# Patient Record
Sex: MALE | Race: WHITE | Employment: FULL TIME
[De-identification: names, ages, dates, MRNs, and addresses within clinical notes are randomized per-mention and may not be internally consistent; named-entity substitution may affect disease eponyms.]

---

## 2017-02-08 PROCEDURE — 87086 URINE CULTURE/COLONY COUNT: CPT | Performed by: EMERGENCY MEDICINE

## 2017-04-19 PROBLEM — K63.5 COLON POLYP: Status: ACTIVE | Noted: 2017-04-19

## 2017-05-23 PROBLEM — K31.89 MASS OF STOMACH: Status: ACTIVE | Noted: 2017-05-23

## 2017-06-29 RX ORDER — GARLIC EXTRACT 500 MG
1 CAPSULE ORAL DAILY
COMMUNITY
End: 2018-04-12

## 2017-07-17 ENCOUNTER — SURGERY (OUTPATIENT)
Age: 57
End: 2017-07-17

## 2017-07-17 ENCOUNTER — HOSPITAL ENCOUNTER (OUTPATIENT)
Facility: HOSPITAL | Age: 57
Setting detail: HOSPITAL OUTPATIENT SURGERY
Discharge: HOME | End: 2017-07-17
Attending: INTERNAL MEDICINE | Admitting: INTERNAL MEDICINE
Payer: COMMERCIAL

## 2017-07-17 ENCOUNTER — ANESTHESIA (OUTPATIENT)
Dept: ENDOSCOPY | Facility: HOSPITAL | Age: 57
End: 2017-07-17
Payer: COMMERCIAL

## 2017-07-17 ENCOUNTER — ANESTHESIA EVENT (OUTPATIENT)
Dept: ENDOSCOPY | Facility: HOSPITAL | Age: 57
End: 2017-07-17
Payer: COMMERCIAL

## 2017-07-17 VITALS
HEIGHT: 66 IN | WEIGHT: 174 LBS | HEART RATE: 65 BPM | TEMPERATURE: 98 F | DIASTOLIC BLOOD PRESSURE: 91 MMHG | OXYGEN SATURATION: 94 % | BODY MASS INDEX: 27.97 KG/M2 | SYSTOLIC BLOOD PRESSURE: 115 MMHG | RESPIRATION RATE: 16 BRPM

## 2017-07-17 DIAGNOSIS — K31.9 GASTRIC LESION: ICD-10-CM

## 2017-07-17 PROCEDURE — 88172 CYTP DX EVAL FNA 1ST EA SITE: CPT | Performed by: INTERNAL MEDICINE

## 2017-07-17 PROCEDURE — 0D948ZX DRAINAGE OF ESOPHAGOGASTRIC JUNCTION, VIA NATURAL OR ARTIFICIAL OPENING ENDOSCOPIC, DIAGNOSTIC: ICD-10-PCS | Performed by: INTERNAL MEDICINE

## 2017-07-17 PROCEDURE — 88305 TISSUE EXAM BY PATHOLOGIST: CPT | Performed by: INTERNAL MEDICINE

## 2017-07-17 PROCEDURE — 88341 IMHCHEM/IMCYTCHM EA ADD ANTB: CPT | Performed by: INTERNAL MEDICINE

## 2017-07-17 PROCEDURE — 88342 IMHCHEM/IMCYTCHM 1ST ANTB: CPT | Performed by: INTERNAL MEDICINE

## 2017-07-17 PROCEDURE — 88177 CYTP FNA EVAL EA ADDL: CPT | Performed by: INTERNAL MEDICINE

## 2017-07-17 PROCEDURE — 88173 CYTOPATH EVAL FNA REPORT: CPT | Performed by: INTERNAL MEDICINE

## 2017-07-17 RX ORDER — ACETAMINOPHEN 160 MG/5ML
650 SOLUTION ORAL EVERY 4 HOURS PRN
Status: DISCONTINUED | OUTPATIENT
Start: 2017-07-17 | End: 2017-07-17

## 2017-07-17 RX ORDER — ONDANSETRON 2 MG/ML
4 INJECTION INTRAMUSCULAR; INTRAVENOUS AS NEEDED
Status: DISCONTINUED | OUTPATIENT
Start: 2017-07-17 | End: 2017-07-17

## 2017-07-17 RX ORDER — NALOXONE HYDROCHLORIDE 0.4 MG/ML
80 INJECTION, SOLUTION INTRAMUSCULAR; INTRAVENOUS; SUBCUTANEOUS AS NEEDED
Status: DISCONTINUED | OUTPATIENT
Start: 2017-07-17 | End: 2017-07-17

## 2017-07-17 RX ORDER — SODIUM CHLORIDE, SODIUM LACTATE, POTASSIUM CHLORIDE, CALCIUM CHLORIDE 600; 310; 30; 20 MG/100ML; MG/100ML; MG/100ML; MG/100ML
INJECTION, SOLUTION INTRAVENOUS CONTINUOUS
Status: DISCONTINUED | OUTPATIENT
Start: 2017-07-17 | End: 2017-07-17

## 2017-07-17 RX ORDER — ACETAMINOPHEN 160 MG/5ML
325 SOLUTION ORAL EVERY 4 HOURS PRN
Status: DISCONTINUED | OUTPATIENT
Start: 2017-07-17 | End: 2017-07-17

## 2017-07-17 NOTE — BRIEF OP NOTE
Pre-Operative Diagnosis: Gastric lesion [K31.9]     Post-Operative Diagnosis: SUBMUCOSAL LESION AT GE JUNCTION     Procedure Performed:   Procedure(s):  ENDOSCOPIC ULTRASOUND WITH FINE NEEDLE ASPIRATION  ESOPHAGOGASTRODUODENOSCOPY     Surgeon(s) and Rol

## 2017-07-17 NOTE — ANESTHESIA POSTPROCEDURE EVALUATION
[facility]    Devyn Alba Patient Status:  Hospital Outpatient Surgery   Age/Gender 61year old male MRN [id number]   Location [address] [facility]. Attending Hilda Bond MD   Hosp Day # 0 PCP Celena Murguia MD       Anesthesia Post-op Not

## 2017-07-17 NOTE — OR NURSING
Patient awake now, eating cheddar goldfish and c/o pain to right upper tooth, the one between the front tooth and the incisor tooth. Patient states it is not loose, but it does \"hurt\". Dr. Cristiana Justin made aware.

## 2017-07-17 NOTE — ANESTHESIA PREPROCEDURE EVALUATION
PRE-OP EVALUATION    Patient Name: Deepa Landaverde    Pre-op Diagnosis: Gastric lesion [K31.9]    Procedure(s):  ENDOSCOPIC ULTRASOUND WITH FINE NEEDLE ASPIRATION,  ESOPHAGOGASTRODUODENOSCOPY       Surgeon(s) and Role:     Xander Corbin MD - Primary Admission:  **None**

## 2017-07-18 NOTE — OPERATIVE REPORT
[facility]    PATIENT'S NAME: Pepper Musa   ATTENDING PHYSICIAN: Cody Carson M.D. OPERATING PHYSICIAN: Cody Carson M.D.    PATIENT ACCOUNT#:   [de-identified]    LOCATION:  [facility] ROOMS 6 EDWP 10  MEDICAL RECORD #:   [id number] cardia. The overlying gastric mucosa appeared unremarkable and intact without ulceration. The bulb and second portion of the duodenum appeared unremarkable. The scope was then removed after suctioning the gastric contents.     Next, the Olympus linear ec

## 2017-08-16 NOTE — PROGRESS NOTES
8/15/2017  [address] [place] [address]    Dear Shira Tinajero,       Here are the biopsy/pathology findings from your recent EGD (Upper  Endoscopy) and endoscopic ultrasound:  1.   Esophagus submucosal lesion biopsies were consistent with a be

## 2017-09-19 PROCEDURE — 82043 UR ALBUMIN QUANTITATIVE: CPT | Performed by: INTERNAL MEDICINE

## 2017-09-19 PROCEDURE — 82570 ASSAY OF URINE CREATININE: CPT | Performed by: INTERNAL MEDICINE

## 2017-09-19 PROCEDURE — 81003 URINALYSIS AUTO W/O SCOPE: CPT | Performed by: INTERNAL MEDICINE

## 2018-04-04 PROCEDURE — 82043 UR ALBUMIN QUANTITATIVE: CPT | Performed by: INTERNAL MEDICINE

## 2018-04-04 PROCEDURE — 82570 ASSAY OF URINE CREATININE: CPT | Performed by: INTERNAL MEDICINE

## 2018-04-04 PROCEDURE — 81003 URINALYSIS AUTO W/O SCOPE: CPT | Performed by: INTERNAL MEDICINE

## 2018-04-12 PROCEDURE — 88304 TISSUE EXAM BY PATHOLOGIST: CPT | Performed by: INTERNAL MEDICINE

## 2018-08-02 PROCEDURE — 82570 ASSAY OF URINE CREATININE: CPT | Performed by: INTERNAL MEDICINE

## 2018-08-02 PROCEDURE — 81003 URINALYSIS AUTO W/O SCOPE: CPT | Performed by: INTERNAL MEDICINE

## 2018-08-02 PROCEDURE — 82043 UR ALBUMIN QUANTITATIVE: CPT | Performed by: INTERNAL MEDICINE

## 2018-08-13 PROBLEM — K31.89 MASS OF STOMACH: Status: RESOLVED | Noted: 2017-05-23 | Resolved: 2018-08-13

## 2019-02-22 PROCEDURE — 82570 ASSAY OF URINE CREATININE: CPT | Performed by: INTERNAL MEDICINE

## 2019-02-22 PROCEDURE — 82043 UR ALBUMIN QUANTITATIVE: CPT | Performed by: INTERNAL MEDICINE

## 2019-03-18 ENCOUNTER — ANESTHESIA EVENT (OUTPATIENT)
Dept: ENDOSCOPY | Facility: HOSPITAL | Age: 59
End: 2019-03-18

## 2019-03-18 ENCOUNTER — HOSPITAL ENCOUNTER (OUTPATIENT)
Facility: HOSPITAL | Age: 59
Setting detail: HOSPITAL OUTPATIENT SURGERY
Discharge: HOME | End: 2019-03-18
Attending: INTERNAL MEDICINE | Admitting: INTERNAL MEDICINE
Payer: COMMERCIAL

## 2019-03-18 ENCOUNTER — ANESTHESIA (OUTPATIENT)
Dept: ENDOSCOPY | Facility: HOSPITAL | Age: 59
End: 2019-03-18

## 2019-03-18 VITALS
DIASTOLIC BLOOD PRESSURE: 109 MMHG | BODY MASS INDEX: 30.17 KG/M2 | HEART RATE: 71 BPM | OXYGEN SATURATION: 94 % | TEMPERATURE: 97 F | WEIGHT: 190 LBS | RESPIRATION RATE: 16 BRPM | SYSTOLIC BLOOD PRESSURE: 145 MMHG | HEIGHT: 66.5 IN

## 2019-03-18 DIAGNOSIS — K31.9 GASTRIC LESION: ICD-10-CM

## 2019-03-18 PROCEDURE — 0DB68ZX EXCISION OF STOMACH, VIA NATURAL OR ARTIFICIAL OPENING ENDOSCOPIC, DIAGNOSTIC: ICD-10-PCS | Performed by: INTERNAL MEDICINE

## 2019-03-18 PROCEDURE — 0DJ08ZZ INSPECTION OF UPPER INTESTINAL TRACT, VIA NATURAL OR ARTIFICIAL OPENING ENDOSCOPIC: ICD-10-PCS | Performed by: INTERNAL MEDICINE

## 2019-03-18 PROCEDURE — 88305 TISSUE EXAM BY PATHOLOGIST: CPT | Performed by: INTERNAL MEDICINE

## 2019-03-18 RX ORDER — NALOXONE HYDROCHLORIDE 0.4 MG/ML
80 INJECTION, SOLUTION INTRAMUSCULAR; INTRAVENOUS; SUBCUTANEOUS AS NEEDED
Status: CANCELLED | OUTPATIENT
Start: 2019-03-18 | End: 2019-03-18

## 2019-03-18 RX ORDER — SODIUM CHLORIDE, SODIUM LACTATE, POTASSIUM CHLORIDE, CALCIUM CHLORIDE 600; 310; 30; 20 MG/100ML; MG/100ML; MG/100ML; MG/100ML
INJECTION, SOLUTION INTRAVENOUS CONTINUOUS
Status: CANCELLED | OUTPATIENT
Start: 2019-03-18

## 2019-03-18 RX ORDER — SODIUM CHLORIDE, SODIUM LACTATE, POTASSIUM CHLORIDE, CALCIUM CHLORIDE 600; 310; 30; 20 MG/100ML; MG/100ML; MG/100ML; MG/100ML
INJECTION, SOLUTION INTRAVENOUS CONTINUOUS
Status: DISCONTINUED | OUTPATIENT
Start: 2019-03-18 | End: 2019-03-18

## 2019-03-18 NOTE — H&P
[facility] Department of  Gastroenterology  Update Health History :       Maninder Andrade  male   Elmer Prajapati MD     [id number]  1/23/1960 Primary Care Physician  Darryl Rodas MD        HPI :  History of GE junction leiomyoma.   Patient is he (36.2 °C) (Oral)   Resp 18   Ht 5' 6.5\" (1.689 m)   Wt 190 lb (86.2 kg)   SpO2 99%   BMI 30.21 kg/m²     Physical Exam:    GENERAL: well developed, well nourished, in no apparent distress   HEENT: PERRLA,  clear   NECK: supple,    LUNGS: clear to ausculta

## 2019-03-18 NOTE — ANESTHESIA POSTPROCEDURE EVALUATION
[facility]    Celester Mention Patient Status:  Hospital Outpatient Surgery   Age/Gender 60year old male MRN [id number]   Location 118 [facility]. Attending Nikhil Sharp MD   Hosp Day # 0 PCP Adalberto Dance, MD       Anesthesia Post-op Not

## 2019-03-18 NOTE — ANESTHESIA PREPROCEDURE EVALUATION
PRE-OP EVALUATION    Patient Name: Maninder Andrade    Pre-op Diagnosis: Gastric lesion [K31.9]    Procedure(s):  ENDOSCOPIC ULTRASOUND (EUS) WITH FINE NEEDLE ASPIRATION    Surgeon(s) and Role:     Taran Virgen MD - Primary    Pre-op vitals reviewed. Drug use:  2 times per week   Types: Cannabis   Comment: 1 X WEEK     Available pre-op labs reviewed.      Lab Results   Component Value Date     02/22/2019    K 4.40 02/22/2019     02/22/2019    CO2 31.0 02/22/2019    BUN 14 02/22/2019    CREAT

## 2019-03-18 NOTE — OPERATIVE REPORT
OPERATIVE REPORT   PATIENT NAME: Peter Bartlett  MRN: [id number]  DATE OF OPERATION: 3/18/2019  PREOPERATIVE DIAGNOSIS:   1. 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 The patient is here for follow-up endoscopy.   He is asymptomatic den esophagogastric junction was patent. There were no endoscopic features of eosinophilic esophagitis or Varghese's esophagus. We were able to advance the scope across the lower esophageal sphincter without any resistance.   2.  On retroflexion examination of

## 2019-03-20 NOTE — PROGRESS NOTES
3/20/2019  Deja Burroughs 44    Dear Philip Hanna,     Here are the biopsy/pathology findings from your recent EGD (upper endoscopy): Biopsies of stomach: a positive test for a bacteria called H-Pylori.   Will need to treat

## 2019-03-20 NOTE — PROGRESS NOTES
Biopsies +H. Pylori- no allergies. 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 Asymptomatic.   POSTOPERATIVE DIAGNOSES:  1. Mucosal changes in the stomach suggestive of H. pylori gastritis with

## 2019-03-20 NOTE — PROGRESS NOTES
Attempted to call the pt with the results and recommendations.  His VM box is full and not accepting messages

## 2019-07-15 PROCEDURE — 87338 HPYLORI STOOL AG IA: CPT | Performed by: NURSE PRACTITIONER

## 2021-10-22 ENCOUNTER — LAB ENCOUNTER (OUTPATIENT)
Dept: LAB | Age: 61
End: 2021-10-22
Attending: INTERNAL MEDICINE
Payer: COMMERCIAL

## 2021-10-22 DIAGNOSIS — K22.9 LESION OF ESOPHAGUS: ICD-10-CM

## 2021-10-25 ENCOUNTER — ANESTHESIA EVENT (OUTPATIENT)
Dept: ENDOSCOPY | Facility: HOSPITAL | Age: 61
End: 2021-10-25
Payer: COMMERCIAL

## 2021-10-25 ENCOUNTER — HOSPITAL ENCOUNTER (OUTPATIENT)
Facility: HOSPITAL | Age: 61
Setting detail: HOSPITAL OUTPATIENT SURGERY
Discharge: HOME | End: 2021-10-25
Attending: INTERNAL MEDICINE | Admitting: INTERNAL MEDICINE
Payer: COMMERCIAL

## 2021-10-25 ENCOUNTER — ANESTHESIA (OUTPATIENT)
Dept: ENDOSCOPY | Facility: HOSPITAL | Age: 61
End: 2021-10-25
Payer: COMMERCIAL

## 2021-10-25 VITALS
RESPIRATION RATE: 18 BRPM | BODY MASS INDEX: 30.45 KG/M2 | DIASTOLIC BLOOD PRESSURE: 84 MMHG | SYSTOLIC BLOOD PRESSURE: 129 MMHG | WEIGHT: 194 LBS | OXYGEN SATURATION: 96 % | TEMPERATURE: 98 F | HEART RATE: 73 BPM | HEIGHT: 67 IN

## 2021-10-25 DIAGNOSIS — K22.9 LESION OF ESOPHAGUS: Primary | ICD-10-CM

## 2021-10-25 PROCEDURE — 0DJ08ZZ INSPECTION OF UPPER INTESTINAL TRACT, VIA NATURAL OR ARTIFICIAL OPENING ENDOSCOPIC: ICD-10-PCS | Performed by: INTERNAL MEDICINE

## 2021-10-25 PROCEDURE — BD42ZZZ ULTRASONOGRAPHY OF STOMACH: ICD-10-PCS | Performed by: INTERNAL MEDICINE

## 2021-10-25 RX ORDER — HYDROCODONE BITARTRATE AND ACETAMINOPHEN 10; 325 MG/1; MG/1
1 TABLET ORAL AS NEEDED
Status: DISCONTINUED | OUTPATIENT
Start: 2021-10-25 | End: 2021-10-25

## 2021-10-25 RX ORDER — METOCLOPRAMIDE HYDROCHLORIDE 5 MG/ML
10 INJECTION INTRAMUSCULAR; INTRAVENOUS AS NEEDED
Status: DISCONTINUED | OUTPATIENT
Start: 2021-10-25 | End: 2021-10-25

## 2021-10-25 RX ORDER — SODIUM CHLORIDE, SODIUM LACTATE, POTASSIUM CHLORIDE, CALCIUM CHLORIDE 600; 310; 30; 20 MG/100ML; MG/100ML; MG/100ML; MG/100ML
INJECTION, SOLUTION INTRAVENOUS CONTINUOUS
Status: DISCONTINUED | OUTPATIENT
Start: 2021-10-25 | End: 2021-10-25

## 2021-10-25 RX ORDER — LIDOCAINE HYDROCHLORIDE 10 MG/ML
INJECTION, SOLUTION EPIDURAL; INFILTRATION; INTRACAUDAL; PERINEURAL AS NEEDED
Status: DISCONTINUED | OUTPATIENT
Start: 2021-10-25 | End: 2021-10-25 | Stop reason: SURG

## 2021-10-25 RX ORDER — HYDROMORPHONE HYDROCHLORIDE 1 MG/ML
0.4 INJECTION, SOLUTION INTRAMUSCULAR; INTRAVENOUS; SUBCUTANEOUS EVERY 5 MIN PRN
Status: DISCONTINUED | OUTPATIENT
Start: 2021-10-25 | End: 2021-10-25

## 2021-10-25 RX ORDER — HYDROCODONE BITARTRATE AND ACETAMINOPHEN 10; 325 MG/1; MG/1
2 TABLET ORAL AS NEEDED
Status: DISCONTINUED | OUTPATIENT
Start: 2021-10-25 | End: 2021-10-25

## 2021-10-25 RX ORDER — NALOXONE HYDROCHLORIDE 0.4 MG/ML
80 INJECTION, SOLUTION INTRAMUSCULAR; INTRAVENOUS; SUBCUTANEOUS AS NEEDED
Status: DISCONTINUED | OUTPATIENT
Start: 2021-10-25 | End: 2021-10-25

## 2021-10-25 RX ORDER — ONDANSETRON 2 MG/ML
4 INJECTION INTRAMUSCULAR; INTRAVENOUS AS NEEDED
Status: DISCONTINUED | OUTPATIENT
Start: 2021-10-25 | End: 2021-10-25

## 2021-10-25 RX ADMIN — SODIUM CHLORIDE, SODIUM LACTATE, POTASSIUM CHLORIDE, CALCIUM CHLORIDE: 600; 310; 30; 20 INJECTION, SOLUTION INTRAVENOUS at 10:05:00

## 2021-10-25 RX ADMIN — LIDOCAINE HYDROCHLORIDE 25 MG: 10 INJECTION, SOLUTION EPIDURAL; INFILTRATION; INTRACAUDAL; PERINEURAL at 09:48:00

## 2021-10-25 NOTE — ANESTHESIA POSTPROCEDURE EVALUATION
[facility]    Manny Carmichael Patient Status:  Hospital Outpatient Surgery   Age/Gender 57year old male MRN [id number]   Location [id number] [place] [address] Attending Beacher Habermann, MD   Hosp Day # 0 PCP MD Deonna [place]

## 2021-10-25 NOTE — ANESTHESIA PREPROCEDURE EVALUATION
PRE-OP EVALUATION    Patient Name: Keisha Bauer    Admit Diagnosis: Lesion of esophagus [K22.9]    Pre-op Diagnosis: Lesion of esophagus [K22.9]    ESOPHAGOGASTRODUODENOSCOPY (EGD); UPPER ENDOSCOPIC ULTRASOUND (EUS)    Anesthesia Procedure: Leonides Yu dental history. Pulmonary    Pulmonary exam normal.                 Other findings            ASA: 2   Plan: MAC  NPO status verified and patient meets guidelines.           Plan/risks discussed with: patient and significant other                Pre

## 2024-10-18 RX ORDER — IRBESARTAN AND HYDROCHLOROTHIAZIDE 150; 12.5 MG/1; MG/1
1 TABLET, FILM COATED ORAL NIGHTLY
COMMUNITY
Start: 2024-07-16

## 2024-10-21 NOTE — DISCHARGE INSTRUCTIONS
Dr. Goldman Post-Op Instructions    Diet:    Eat light foods tonight and resume normal diet tomorrow.  If you develop nausea, stick to liquids until the nausea goes away.      Activity:    May walk and may climb stairs, Avoid heavy lifting greater than 15 lbs, and Avoid bending or straining for 2 weeks.  No driving for 3 days and until you have stopped taking prescription pain medications.    You may shower the day after your surgery.     Medications:    You may restart taking  , tonight and all your previous medications tonight at your regular time and dose unless instructed otherwise.      To manage pain you may use the following as a guideline:  Ice 2-3 times a day for 20-30 minute periods.  Tylenol - you may take extra strength Tylenol up to 1000 mg every 8 hours.  Do not exceed 4000 mg of Tylenol in a 24-hour period.  Advil - you may take 2 pills every 6-8 hours with food.  Prescription pain medication - only use for severe breakthrough pain    Please note, prescription pain medication can make you nauseated, bloated and constipated.  A stool softener will be sent to your pharmacy to help avoid constipation.  Examples of severe pain include, unable to sleep due to pain, or waking up in the middle of the night due to pain. Take it with food and discontinue if side effects occur. No alcohol or driving while taking prescription pain medications.     Do not take Aleve or Advil if allergic to them or if allergic to aspirin.      Dressing:   Place ice pack to operative site 3 times a day for the first 48 hours.   You may shower tomorrow with the operative site away from the shower head. Do not submerge your wounds in water (i.e. no baths, swimming, or water aerobics) for 4 weeks.  If skin glue was used, do not remove it as it will fall off on its own.    All incisions become somewhat hard and sometimes lumpy. That is part of the normal healing process. Bruising around the incisions or lower is also normal and should  resolve with time.     Low grade fever up to 101F is normal after surgery. Severe swelling, fever > 101.5, redness or drainage from wound should be reported to your surgeon. Call the office number during and after hours if needed.      Follow-up:      Call the office at [phone].  Make appointment to see Dr. Goldman as needed, follow up with oncology nurses.              HOME INSTRUCTIONS  [facility] HOME CARE INSTRUCTIONS: POST-OP ANESTHESIA  The medication that you received for sedation or general anesthesia can last up to 24 hours. Your judgment and reflexes may be altered, even if you feel like your normal self.      We Recommend:   Do not drive any motor vehicle or bicycle   Avoid mowing the lawn, playing sports, or working with power tools/applicances (power saws, electric knives or mixers)   That you have someone stay with you on your first night home   Do not drink alcohol or take sleeping pills or tranquilizers   Do not sign legal documents within 24 hours of your procedure   If you had a nerve block for your surgery, take extra care not to put any pressure on your arm or hand for 24 hours    It is normal:  For you to have a sore throat if you had a breathing tube during surgery (while you were asleep!). The sore throat should get better within 48 hours. You can gargle with warm salt water (1/2 tsp in 4 oz warm water) or use a throat lozenge for comfort  To feel muscle aches or soreness especially in the abdomen, chest or neck. The achy feeling should go away in the next 24 hours  To feel weak, sleepy or \"wiped out\". Your should start feeling better in the next 24 hours.   To experience mild discomforts such as sore lip or tongue, headache, cramps, gas pains or a bloated feeling in your abdomen.   To experience mild back pain or soreness for a day or two if you had spinal or epidural anesthesia.   If you had laparoscopic surgery, to feel shoulder pain or discomfort on the day of surgery.   For some  patients to have nausea after surgery/anesthesia    If you feel nausea or experience vomiting:   Try to move around less.   Eat less than usual or drink only liquids until the next morning   Nausea should resolve in about 24 hours    If you have a problem when you are at home:    Call your surgeons office   Discharge Instructions: After Your Surgery  You’ve just had surgery. During surgery, you were given medicine called anesthesia to keep you relaxed and free of pain. After surgery, you may have some pain or nausea. This is common. Here are some tips for feeling better and getting well after surgery.   Going home  Your healthcare provider will show you how to take care of yourself when you go home. They'll also answer your questions. Have an adult family member or friend drive you home. For the first 24 hours after your surgery:   Don't drive or use heavy equipment.  Don't make important decisions or sign legal papers.  Take medicines as directed.  Don't drink alcohol.  Have someone stay with you, if needed. They can watch for problems and help keep you safe.  Be sure to go to all follow-up visits with your healthcare provider. And rest after your surgery for as long as your provider tells you to.   Coping with pain  If you have pain after surgery, pain medicine will help you feel better. Take it as directed, before pain becomes severe. Also, ask your healthcare provider or pharmacist about other ways to control pain. This might be with heat, ice, or relaxation. And follow any other instructions your surgeon or nurse gives you.      Stay on schedule with your medicine.     Tips for taking pain medicine  To get the best relief possible, remember these points:   Pain medicines can upset your stomach. Taking them with a little food may help.  Most pain relievers taken by mouth need at least 20 to 30 minutes to start to work.  Don't wait till your pain becomes severe before you take your medicine. Try to time your  medicine so that you can take it before starting an activity. This might be before you get dressed, go for a walk, or sit down for dinner.  Constipation is a common side effect of some pain medicines. Call your healthcare provider before taking any medicines such as laxatives or stool softeners to help ease constipation. Also ask if you should skip any foods. Drinking lots of fluids and eating foods such as fruits and vegetables that are high in fiber can also help. Remember, don't take laxatives unless your surgeon has prescribed them.  Drinking alcohol and taking pain medicine can cause dizziness and slow your breathing. It can even be deadly. Don't drink alcohol while taking pain medicine.  Pain medicine can make you react more slowly to things. Don't drive or run machinery while taking pain medicine.  Your healthcare provider may tell you to take acetaminophen to help ease your pain. Ask them how much you're supposed to take each day. Acetaminophen or other pain relievers may interact with your prescription medicines or other over-the-counter (OTC) medicines. Some prescription medicines have acetaminophen and other ingredients in them. Using both prescription and OTC acetaminophen for pain can cause you to accidentally overdose. Read the labels on your OTC medicines with care. This will help you to clearly know the list of ingredients, how much to take, and any warnings. It may also help you not take too much acetaminophen. If you have questions or don't understand the information, ask your pharmacist or healthcare provider to explain it to you before you take the OTC medicine.   Managing nausea  Some people have an upset stomach (nausea) after surgery. This is often because of anesthesia, pain, or pain medicine, less movement of food in the stomach, or the stress of surgery. These tips will help you handle nausea and eat healthy foods as you get better. If you were on a special food plan before surgery, ask  your healthcare provider if you should follow it while you get better. Check with your provider on how your eating should progress. It may depend on the surgery you had. These general tips may help:   Don't push yourself to eat. Your body will tell you when to eat and how much.  Start off with clear liquids and soup. They're easier to digest.  Next try semi-solid foods as you feel ready. These include mashed potatoes, applesauce, and gelatin.  Slowly move to solid foods. Don’t eat fatty, rich, or spicy foods at first.  Don't force yourself to have 3 large meals a day. Instead eat smaller amounts more often.  Take pain medicines with a small amount of solid food, such as crackers or toast. This helps prevent nausea.  When to call your healthcare provider  Call your healthcare provider right away if you have any of these:   You still have too much pain, or the pain gets worse, after taking the medicine. The medicine may not be strong enough. Or there may be a complication from the surgery.  You feel too sleepy, dizzy, or groggy. The medicine may be too strong.  Side effects such as nausea or vomiting. Your healthcare provider may advise taking other medicines to .  Skin changes such as rash, itching, or hives. This may mean you have an allergic reaction. Your provider may advise taking other medicines.  The incision looks different (for instance, part of it opens up).  Bleeding or fluid leaking from the incision site, and weren't told to expect that.  Fever of 100.4°F (38°C) or higher, or as directed by your provider.  Call 911  Call 911 right away if you have:   Trouble breathing  Facial swelling    If you have obstructive sleep apnea   You were given anesthesia medicine during surgery to keep you comfortable and free of pain. After surgery, you may have more apnea spells because of this medicine and other medicines you were given. The spells may last longer than normal.    At home:  Keep using the continuous  positive airway pressure (CPAP) device when you sleep. Unless your healthcare provider tells you not to, use it when you sleep, day or night. CPAP is a common device used to treat obstructive sleep apnea.  Talk with your provider before taking any pain medicine, muscle relaxants, or sedatives. Your provider will tell you about the possible dangers of taking these medicines.  Contact your provider if your sleeping changes a lot even when taking medicines as directed.  Steve last reviewed this educational content on 10/1/2021  © 2293-2636 [employer]. All rights reserved. This information is not intended as a substitute for professional medical care. Always follow your healthcare professional's instructions.

## 2024-10-22 NOTE — H&P
Lee Melo is a 64 year old male.     HPI:      The patient presents for evaluation for insertion of port.  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 Oncology rec ddMVAC 3-6 cycles. NIAGARA trial results available however not fda approved at this point -- pulm nodules too small for biopsy or pet.      Allergies:  Allergies[1]   Current Meds:  Current Outpatient Medications   Medication Sig Dispense Refill    Irbesartan-hydroCHLOROthiazide 150-12.5 MG Oral Tab Take 1 tablet by mouth at bedtime.      ROSUVASTATIN 20 MG Oral Tab TAKE 1 TABLET BY MOUTH NIGHTLY 90 tablet 0    OMEPRAZOLE 40 MG Oral Capsule Delayed Release TAKE ONE CAPSULE BY MOUTH ONE TIME DAILY (Patient taking differently: Take 1 capsule (40 mg total) by mouth at bedtime.) 90 capsule 1        HISTORY:  Past Medical History:    Anxiety    Cancer (HCC)    bladder    Esophageal reflux    High blood pressure    High cholesterol    Osteoarthritis    Visual impairment    glasses      Past Surgical History:   Procedure Laterality Date    Colonoscopy  2020    Other surgical history      transurethral resection of bladder    Tonsillectomy        Family History   Problem Relation Age of Onset    Cancer Mother     Other (Other[other]) Mother         CVA      Social History     Socioeconomic History    Marital status: Single   Tobacco Use    Smoking status: Former     Current packs/day: 0.00     Average packs/day: 2.0 packs/day for 30.0 years (60.0 ttl pk-yrs)     Types: Cigarettes     Start date: 1985     Quit date: 2015     Years since quittin.5    Smokeless tobacco: Never   Vaping Use    Vaping status: Never Used   Substance and Sexual Activity    Alcohol use: Yes     Comment: about 2 beers daily    Drug use: Yes     Frequency: 2.0 times per week     Types: Cannabis     Comment: 1 X WEEK, smoking        ROS:   GENERAL HEALTH: otherwise feels well, no weight loss, no fever or  chills  SKIN: denies any unusual skin rashes or jaundice  HEENT: denies nasal congestion, sinus pain or sore throat; hearing loss negative  RESPIRATORY: denies shortness of breath, wheezing or cough   CARDIOVASCULAR: denies chest pain or SAMANIEGO; no palpitations   GI: denies nausea, vomiting, constipation, diarrhea; no rectal bleeding; no heartburn  GENITAL/: no dysuria, urgency or frequency, no tea colored urine  MUSCULOSKELETAL: no joint complaints upper or lower extremities  HEMATOLOGY: denies hx anemia; denies bruising or excessive bleeding    PHYSICAL EXAM:     General Appearance: in no acute distress, well developed, well nourished male  Neck: soft, supple, nontender, the external jugular vein is visible.  Lymph Nodes: normal, no cervical adenopathy, no palpable adenopathy.   Heart: no murmurs, regular rate and rhythm, S1, S2 normal.   Lungs: clear to auscultation bilaterally.   Abdomen: bowel sounds present, soft, nondistended, nontender, no hepatosplenomegaly.   Extremities: no clubbing, cyanosis, or edema.  Skin: normal, no rashes.  Neuro: no focal deficits     ASSESSMENT/ PLAN:     T2 Bladder Urothelial Cancer    The patient is a good candidate for insertion of port.    I had a length discussion with the patient. Risks of the procedure including bleeding, infection, non functional port, infected port,  need for revision or removal of the port, hemothorax, pneumothorax, subclavian or jugular vein thrombosis; as well as the risks with anesthesia were all discussed in detail with the patient.   The patient understood all of the above and consented to the procedure.   The above referenced H&P was reviewed by Joey Goldman MD on 10/23/2024, the patient was examined and no significant changes have occurred in the patient's condition since the H&P was performed.  Risks and benefits were discussed, proceed with procedure as planned.  Joey Goldman MD [facility]  10/23/24  7:36 AM                 [1]   Allergies  Allergen Reactions    No Known Allergies

## 2024-10-23 ENCOUNTER — ANESTHESIA (OUTPATIENT)
Dept: SURGERY | Facility: HOSPITAL | Age: 64
End: 2024-10-23
Payer: COMMERCIAL

## 2024-10-23 ENCOUNTER — APPOINTMENT (OUTPATIENT)
Dept: GENERAL RADIOLOGY | Facility: HOSPITAL | Age: 64
End: 2024-10-23
Attending: SURGERY
Payer: COMMERCIAL

## 2024-10-23 ENCOUNTER — ANESTHESIA EVENT (OUTPATIENT)
Dept: SURGERY | Facility: HOSPITAL | Age: 64
End: 2024-10-23
Payer: COMMERCIAL

## 2024-10-23 ENCOUNTER — HOSPITAL ENCOUNTER (OUTPATIENT)
Facility: HOSPITAL | Age: 64
Setting detail: HOSPITAL OUTPATIENT SURGERY
Discharge: HOME | End: 2024-10-23
Attending: SURGERY | Admitting: SURGERY
Payer: COMMERCIAL

## 2024-10-23 VITALS
OXYGEN SATURATION: 98 % | DIASTOLIC BLOOD PRESSURE: 74 MMHG | RESPIRATION RATE: 12 BRPM | BODY MASS INDEX: 27.64 KG/M2 | SYSTOLIC BLOOD PRESSURE: 118 MMHG | HEIGHT: 66 IN | TEMPERATURE: 98 F | HEART RATE: 63 BPM | WEIGHT: 172 LBS

## 2024-10-23 DIAGNOSIS — C67.9 MALIGNANT NEOPLASM OF URINARY BLADDER, UNSPECIFIED SITE (HCC): Primary | ICD-10-CM

## 2024-10-23 PROCEDURE — 02HV33Z INSERTION OF INFUSION DEVICE INTO SUPERIOR VENA CAVA, PERCUTANEOUS APPROACH: ICD-10-PCS | Performed by: SURGERY

## 2024-10-23 PROCEDURE — 0JH60WZ INSERTION OF TOTALLY IMPLANTABLE VASCULAR ACCESS DEVICE INTO CHEST SUBCUTANEOUS TISSUE AND FASCIA, OPEN APPROACH: ICD-10-PCS | Performed by: SURGERY

## 2024-10-23 PROCEDURE — 76000 FLUOROSCOPY <1 HR PHYS/QHP: CPT | Performed by: SURGERY

## 2024-10-23 DEVICE — SMART PORT CT SINGLE TITANIUM PORT SYSTEM WITH ATTACHABLE 8.0F X 66CM POLYURETHANE CATHETER AND 8 F INTRODUCER KIT
Type: IMPLANTABLE DEVICE | Site: CHEST | Status: FUNCTIONAL
Brand: SMART PORT CT

## 2024-10-23 RX ORDER — MORPHINE SULFATE 4 MG/ML
2 INJECTION, SOLUTION INTRAMUSCULAR; INTRAVENOUS EVERY 10 MIN PRN
Status: DISCONTINUED | OUTPATIENT
Start: 2024-10-23 | End: 2024-10-23

## 2024-10-23 RX ORDER — TRAMADOL HYDROCHLORIDE 50 MG/1
50 TABLET ORAL EVERY 6 HOURS PRN
Qty: 5 TABLET | Refills: 0 | Status: SHIPPED | OUTPATIENT
Start: 2024-10-23

## 2024-10-23 RX ORDER — NALOXONE HYDROCHLORIDE 0.4 MG/ML
0.08 INJECTION, SOLUTION INTRAMUSCULAR; INTRAVENOUS; SUBCUTANEOUS AS NEEDED
Status: DISCONTINUED | OUTPATIENT
Start: 2024-10-23 | End: 2024-10-23

## 2024-10-23 RX ORDER — HEPARIN SODIUM 5000 [USP'U]/ML
INJECTION, SOLUTION INTRAVENOUS; SUBCUTANEOUS AS NEEDED
Status: DISCONTINUED | OUTPATIENT
Start: 2024-10-23 | End: 2024-10-23 | Stop reason: HOSPADM

## 2024-10-23 RX ORDER — DOCUSATE SODIUM 100 MG/1
100 CAPSULE, LIQUID FILLED ORAL 2 TIMES DAILY
Qty: 14 CAPSULE | Refills: 0 | Status: SHIPPED | OUTPATIENT
Start: 2024-10-23 | End: 2024-10-30

## 2024-10-23 RX ORDER — SODIUM CHLORIDE, SODIUM LACTATE, POTASSIUM CHLORIDE, CALCIUM CHLORIDE 600; 310; 30; 20 MG/100ML; MG/100ML; MG/100ML; MG/100ML
INJECTION, SOLUTION INTRAVENOUS CONTINUOUS
Status: DISCONTINUED | OUTPATIENT
Start: 2024-10-23 | End: 2024-10-23

## 2024-10-23 RX ORDER — HYDROMORPHONE HYDROCHLORIDE 1 MG/ML
0.6 INJECTION, SOLUTION INTRAMUSCULAR; INTRAVENOUS; SUBCUTANEOUS EVERY 5 MIN PRN
Status: DISCONTINUED | OUTPATIENT
Start: 2024-10-23 | End: 2024-10-23

## 2024-10-23 RX ORDER — MORPHINE SULFATE 10 MG/ML
6 INJECTION, SOLUTION INTRAMUSCULAR; INTRAVENOUS EVERY 10 MIN PRN
Status: DISCONTINUED | OUTPATIENT
Start: 2024-10-23 | End: 2024-10-23

## 2024-10-23 RX ORDER — ACETAMINOPHEN 500 MG
1000 TABLET ORAL ONCE
Status: COMPLETED | OUTPATIENT
Start: 2024-10-23 | End: 2024-10-23

## 2024-10-23 RX ORDER — DEXAMETHASONE SODIUM PHOSPHATE 4 MG/ML
VIAL (ML) INJECTION AS NEEDED
Status: DISCONTINUED | OUTPATIENT
Start: 2024-10-23 | End: 2024-10-23 | Stop reason: SURG

## 2024-10-23 RX ORDER — ONDANSETRON 2 MG/ML
4 INJECTION INTRAMUSCULAR; INTRAVENOUS EVERY 6 HOURS PRN
Status: DISCONTINUED | OUTPATIENT
Start: 2024-10-23 | End: 2024-10-23

## 2024-10-23 RX ORDER — HYDROMORPHONE HYDROCHLORIDE 1 MG/ML
0.4 INJECTION, SOLUTION INTRAMUSCULAR; INTRAVENOUS; SUBCUTANEOUS EVERY 5 MIN PRN
Status: DISCONTINUED | OUTPATIENT
Start: 2024-10-23 | End: 2024-10-23

## 2024-10-23 RX ORDER — HYDROMORPHONE HYDROCHLORIDE 1 MG/ML
0.2 INJECTION, SOLUTION INTRAMUSCULAR; INTRAVENOUS; SUBCUTANEOUS EVERY 5 MIN PRN
Status: DISCONTINUED | OUTPATIENT
Start: 2024-10-23 | End: 2024-10-23

## 2024-10-23 RX ORDER — MORPHINE SULFATE 4 MG/ML
4 INJECTION, SOLUTION INTRAMUSCULAR; INTRAVENOUS EVERY 10 MIN PRN
Status: DISCONTINUED | OUTPATIENT
Start: 2024-10-23 | End: 2024-10-23

## 2024-10-23 RX ORDER — METOCLOPRAMIDE HYDROCHLORIDE 5 MG/ML
10 INJECTION INTRAMUSCULAR; INTRAVENOUS EVERY 8 HOURS PRN
Status: DISCONTINUED | OUTPATIENT
Start: 2024-10-23 | End: 2024-10-23

## 2024-10-23 RX ORDER — ONDANSETRON 2 MG/ML
INJECTION INTRAMUSCULAR; INTRAVENOUS AS NEEDED
Status: DISCONTINUED | OUTPATIENT
Start: 2024-10-23 | End: 2024-10-23 | Stop reason: SURG

## 2024-10-23 RX ORDER — LIDOCAINE HYDROCHLORIDE 10 MG/ML
INJECTION, SOLUTION EPIDURAL; INFILTRATION; INTRACAUDAL; PERINEURAL AS NEEDED
Status: DISCONTINUED | OUTPATIENT
Start: 2024-10-23 | End: 2024-10-23 | Stop reason: SURG

## 2024-10-23 RX ADMIN — LIDOCAINE HYDROCHLORIDE 30 MG: 10 INJECTION, SOLUTION EPIDURAL; INFILTRATION; INTRACAUDAL; PERINEURAL at 07:47:00

## 2024-10-23 RX ADMIN — ONDANSETRON 4 MG: 2 INJECTION INTRAMUSCULAR; INTRAVENOUS at 07:57:00

## 2024-10-23 RX ADMIN — DEXAMETHASONE SODIUM PHOSPHATE 4 MG: 4 MG/ML VIAL (ML) INJECTION at 07:57:00

## 2024-10-23 NOTE — OPERATIVE REPORT
[facility] OPERATING ROOM OPERATIVE REPORT:     PATIENT NAME: Lee Melo  : 1960   MRN: [id number]    DATE OF OPERATION:   10/23/24    PREOPERATIVE DIAGNOSIS: Bladder Cancer, Need for IV access for chemotherapy     POSTOPERATIVE DIAGNOSIS: Same     PROCEDURE PERFORMED: Insertion of Smart port with intraoperative fluoroscopy guidance and ultrasound guidance     SURGEON:  Joey Goldman MD    ASST: Jerry Baxter PA-C (Assistant helped position patient and helped with positioning, intraoperative retraction, suturing, wound closure, etc)     ANESTHESIA: MAC and Local anesthesia     ESTIMATED BLOOD LOSS:   5 ml    The patient and his wife understood the indications, details and potential risks and complications including bleeding, infection, pneumothorax, malfunction, need for revision, etc. The patient and his wife consented to the procedure.    The patient was brought to the operating room and was induced under monitored anesthesia care (MAC). The neck and chest were prepped and draped. Local anesthetic was infiltrated. An 18 ga needle was used to insert guidewire into the right internal jugular vein using ultrasound guidance and positioned into the superior vena cava using fluoroscopy. An infraclavicular incision was made and a pocket inferior to the incision and above the pectoralis muscle. The catheter was tunneled from this incision to the guidewire insertion site. With the patient in trendelenburg position, the guidewire was removed, the dilator was removed, and the catheter was placed through the sheath and the sheath was removed. The catheter tip was positioned at the superior vena cava above the tricuspid valve and the proximal extent was trimmed and connected to the port. The port was sutured to the pectoralis fascia in 4 locations with 2-0 Prolene suture. The subcutaneous tissues were sutured with 3-0 Vicryl suture and the skin was closed with 4-0 Vicryl subcuticular suture.  Dermabond dressing was placed. The patient tolerated the procedure well.        Joey Goldman MD

## 2024-10-23 NOTE — ANESTHESIA PREPROCEDURE EVALUATION
Anesthesia PreOp Note    HPI:     Lee Melo is a 64 year old male who presents for preoperative consultation requested by: Joey Goldman MD    Date of Surgery: 10/23/2024    Procedure(s):  Placement of port-a-catheter  Indication: Exhausted vascular access    Relevant Problems   No relevant active problems       NPO:  Last Liquid Consumption Date: 10/22/24  Last Liquid Consumption Time: 0500 (sips of water)  Last Solid Consumption Date: 10/22/24  Last Solid Consumption Time: 1900  Last Liquid Consumption Date: 10/22/24          History Review:  Patient Active Problem List    Diagnosis Date Noted    Colon polyp 2017    Anxiety state 2015    Esophageal reflux 2015    Essential hypertension 2015    Hyperlipidemia, unspecified 2015       Past Medical History:    Anxiety    Cancer (HCC)    bladder    Esophageal reflux    High blood pressure    High cholesterol    Osteoarthritis    PONV (postoperative nausea and vomiting)    Visual impairment    glasses       Past Surgical History:   Procedure Laterality Date    Colonoscopy  2020    Other surgical history      transurethral resection of bladder    Tonsillectomy         Prescriptions Prior to Admission[1]  Current Medications and Prescriptions Ordered in Epic[2]    Allergies[3]    Family History   Problem Relation Age of Onset    Cancer Mother     Other (Other[other]) Mother         CVA     Social History     Socioeconomic History    Marital status: Single   Tobacco Use    Smoking status: Former     Current packs/day: 0.00     Average packs/day: 2.0 packs/day for 30.0 years (60.0 ttl pk-yrs)     Types: Cigarettes     Start date: 1985     Quit date: 2015     Years since quittin.5    Smokeless tobacco: Never   Vaping Use    Vaping status: Never Used   Substance and Sexual Activity    Alcohol use: Yes     Comment: about 2 beers daily    Drug use: Yes     Frequency: 2.0 times per week     Types: Cannabis     Comment:  1 X WEEK, smoking       Available pre-op labs reviewed.             Vital Signs:  Body mass index is 27.76 kg/m².   height is 1.676 m (5' 6\") and weight is 78 kg (172 lb). His oral temperature is 97.7 °F (36.5 °C). His blood pressure is 124/82 and his pulse is 66. His respiration is 20 and oxygen saturation is 98%.   Vitals:    10/18/24 1644 10/23/24 0601   BP:  124/82   Pulse:  66   Resp:  20   Temp:  97.7 °F (36.5 °C)   TempSrc:  Oral   SpO2:  98%   Weight: 78.9 kg (174 lb) 78 kg (172 lb)   Height: 1.676 m (5' 6\") 1.676 m (5' 6\")        Anesthesia Evaluation     Patient summary reviewed and Nursing notes reviewed    History of anesthetic complications (ponv)   Airway   Mallampati: II  TM distance: <3 FB  Neck ROM: full  Dental    (+) upper dentures    Pulmonary - normal exam   Cardiovascular - normal exam  (+) hypertension    Neuro/Psych    (+)  anxiety/panic attacks,        GI/Hepatic/Renal    (+) GERD    Endo/Other      Comments: Bladder cancer  Abdominal                  Anesthesia Plan:   ASA:  3  Plan:   MAC  Post-op Pain Management: IV analgesics and Oral pain medication  Informed Consent Plan and Risks Discussed With:  Patient and spouse  Discussed plan with:  CRNA      I have informed Lee Melo and/or legal guardian or family member of the nature of the anesthetic plan, benefits, risks including possible dental damage if relevant, major complications, and any alternative forms of anesthetic management.   All of the patient's questions were answered to the best of my ability. The patient desires the anesthetic management as planned.  Ron Ramirez MD  10/23/2024 6:40 AM  Present on Admission:  **None**           [1]   Medications Prior to Admission   Medication Sig Dispense Refill Last Dose/Taking    Irbesartan-hydroCHLOROthiazide 150-12.5 MG Oral Tab Take 1 tablet by mouth at bedtime.   10/20/2024    ROSUVASTATIN 20 MG Oral Tab TAKE 1 TABLET BY MOUTH NIGHTLY 90 tablet 0 10/20/2024    OMEPRAZOLE 40 MG  Oral Capsule Delayed Release TAKE ONE CAPSULE BY MOUTH ONE TIME DAILY (Patient taking differently: Take 1 capsule (40 mg total) by mouth at bedtime.) 90 capsule 1 10/20/2024   [2]   Current Facility-Administered Medications Ordered in Epic   Medication Dose Route Frequency Provider Last Rate Last Admin    lactated ringers infusion   Intravenous Continuous Susi, Joey MARQUEZ MD 20 mL/hr at 10/23/24 0637 New Bag at 10/23/24 0637    ceFAZolin (Ancef) 2g in 10mL IV syringe premix  2 g Intravenous Once Joey Goldman MD         No current [facility]-ordered outpatient medications on file.   [3]   Allergies  Allergen Reactions    No Known Allergies

## 2024-10-23 NOTE — ANESTHESIA POSTPROCEDURE EVALUATION
Patient: Lee Melo    Procedure Summary       Date: 10/23/24 Room / Location: [facility] MAIN OR 03 / [facility] MAIN OR    Anesthesia Start: 0743 Anesthesia Stop: 0848    Procedure: Placement of port-a-catheter right side (Chest) Diagnosis: (Exhausted vascular access)    Surgeons: Joey Goldman MD Anesthesiologist: Ron Ramirez MD    Anesthesia Type: MAC ASA Status: 3            Anesthesia Type: MAC    Vitals Value Taken Time   /72 10/23/24 0846   Temp 98 10/23/24 0848   Pulse 66 10/23/24 0847   Resp 15 10/23/24 0847   SpO2 95 % 10/23/24 0847   Vitals shown include unfiled device data.    [facility] AN Post Evaluation:   Patient Evaluated in PACU  Patient Participation: complete - patient participated  Level of Consciousness: awake and alert  Pain Score: 0  Pain Management: adequate  Airway Patency:patent  Dental exam unchanged from preop  Yes    Nausea/Vomiting: none  Cardiovascular Status: acceptable  Respiratory Status: acceptable  Postoperative Hydration acceptable      Ron Ramirez MD  10/23/2024 8:48 AM

## (undated) DEVICE — ENDOSCOPY PACK - LOWER: Brand: MEDLINE INDUSTRIES, INC.

## (undated) DEVICE — DRAPE,T,LAPARO,TRANS,STERILE: Brand: MEDLINE

## (undated) DEVICE — ENDOSCOPY PACK UPPER: Brand: MEDLINE INDUSTRIES, INC.

## (undated) DEVICE — 1200CC GUARDIAN II: Brand: GUARDIAN

## (undated) DEVICE — FILTERLINE NASAL ADULT O2/CO2

## (undated) DEVICE — Device: Brand: DEFENDO AIR/WATER/SUCTION AND BIOPSY VALVE

## (undated) DEVICE — 3M™ IOBAN™ 2 ANTIMICROBIAL INCISE DRAPE 6640EZ: Brand: IOBAN™ 2

## (undated) DEVICE — SUT PROL 2-0 30IN SH NABSRB BLU L26MM 1/2 CIR

## (undated) DEVICE — SUT PERMA- 2-0 18IN NABSRB BLK TIE SILK

## (undated) DEVICE — 3M™ RED DOT™ MONITORING ELECTRODE WITH FOAM TAPE AND STICKY GEL, 50/BAG, 20/CASE, 72/PLT 2570: Brand: RED DOT™

## (undated) DEVICE — 12 ML SYRINGE LUER-LOCK TIP: Brand: MONOJECT

## (undated) DEVICE — COVER PRB W5.5XL36IN 3D TELSCP FLD EXT GEL

## (undated) DEVICE — NEEDLE HYPO 18GA L1.5IN PNK SS PVT SHLD BVL

## (undated) DEVICE — UNDYED BRAIDED (POLYGLACTIN 910), SYNTHETIC ABSORBABLE SUTURE: Brand: COATED VICRYL

## (undated) DEVICE — FORCEP BIOPSY HOT RADIAL JAW

## (undated) DEVICE — INTENDED FOR TISSUE SEPARATION, AND OTHER PROCEDURES THAT REQUIRE A SHARP SURGICAL BLADE TO PUNCTURE OR CUT.: Brand: BARD-PARKER ® STAINLESS STEEL BLADES

## (undated) DEVICE — MEDI-VAC SUCTION HANDLE REGULAR CAPACITY: Brand: CARDINAL HEALTH

## (undated) DEVICE — VIOLET BRAIDED (POLYGLACTIN 910), SYNTHETIC ABSORBABLE SUTURE: Brand: COATED VICRYL

## (undated) DEVICE — C-ARM: Brand: UNBRANDED

## (undated) DEVICE — 20 ML SYRINGE LUER-LOCK TIP: Brand: MONOJECT

## (undated) DEVICE — NEEDLE ENDO EXPECT SL 19G EUS

## (undated) DEVICE — GAMMEX® PI HYBRID SIZE 8, STERILE POWDER-FREE SURGICAL GLOVE, POLYISOPRENE AND NEOPRENE BLEND: Brand: GAMMEX

## (undated) DEVICE — MINOR GENERAL: Brand: MEDLINE INDUSTRIES, INC.

## (undated) DEVICE — PROVE COVER: Brand: UNBRANDED

## (undated) DEVICE — COVER PRB 1X11.8IN ENDOCAVITY CLR E BND

## (undated) DEVICE — ENCORE® LATEX MICRO SIZE 8, STERILE LATEX POWDER-FREE SURGICAL GLOVE: Brand: ENCORE

## (undated) DEVICE — SOLUTION IRRIG 1000ML 0.9% NACL USP BTL

## (undated) DEVICE — FORCEP RADIAL JAW 4

## (undated) NOTE — LETTER
8/15/2017          [address] [place] [address]    Dear Sarika Parker,       Here are the biopsy/pathology findings from your recent EGD (Upper  Endoscopy) and endoscopic ultrasound:  1.   Esophagus submucosal lesion biopsies were consiste

## (undated) NOTE — LETTER
3/20/2019          Deja Burroughs 44    Dear Mj De La Paz,     Here are the biopsy/pathology findings from your recent EGD (upper endoscopy): Biopsies of stomach: a positive test for a bacteria called H-Pylori.   Will n